# Patient Record
Sex: MALE | Race: WHITE | NOT HISPANIC OR LATINO | Employment: FULL TIME | URBAN - NONMETROPOLITAN AREA
[De-identification: names, ages, dates, MRNs, and addresses within clinical notes are randomized per-mention and may not be internally consistent; named-entity substitution may affect disease eponyms.]

---

## 2023-04-07 PROBLEM — R05.9 COUGH: Status: ACTIVE | Noted: 2023-04-07

## 2024-10-06 ENCOUNTER — HOSPITAL ENCOUNTER (EMERGENCY)
Facility: HOSPITAL | Age: 34
Discharge: HOME | End: 2024-10-06
Payer: COMMERCIAL

## 2024-10-06 ENCOUNTER — APPOINTMENT (OUTPATIENT)
Dept: RADIOLOGY | Facility: HOSPITAL | Age: 34
End: 2024-10-06
Payer: COMMERCIAL

## 2024-10-06 VITALS
HEIGHT: 73 IN | SYSTOLIC BLOOD PRESSURE: 113 MMHG | DIASTOLIC BLOOD PRESSURE: 78 MMHG | HEART RATE: 60 BPM | TEMPERATURE: 97.9 F | OXYGEN SATURATION: 97 % | RESPIRATION RATE: 16 BRPM | WEIGHT: 135 LBS | BODY MASS INDEX: 17.89 KG/M2

## 2024-10-06 DIAGNOSIS — S63.501A WRIST SPRAIN, RIGHT, INITIAL ENCOUNTER: Primary | ICD-10-CM
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
## 2024-10-06 PROCEDURE — 73130 X-RAY EXAM OF HAND: CPT | Mod: RT

## 2024-10-06 PROCEDURE — 99284 EMERGENCY DEPT VISIT MOD MDM: CPT | Mod: 25

## 2024-10-06 PROCEDURE — 73110 X-RAY EXAM OF WRIST: CPT | Mod: RT

## 2024-10-06 PROCEDURE — 73110 X-RAY EXAM OF WRIST: CPT | Mod: RIGHT SIDE | Performed by: RADIOLOGY

## 2024-10-06 PROCEDURE — 73130 X-RAY EXAM OF HAND: CPT | Mod: RIGHT SIDE | Performed by: RADIOLOGY

## 2024-10-06 ASSESSMENT — COLUMBIA-SUICIDE SEVERITY RATING SCALE - C-SSRS
1. IN THE PAST MONTH, HAVE YOU WISHED YOU WERE DEAD OR WISHED YOU COULD GO TO SLEEP AND NOT WAKE UP?: NO
6. HAVE YOU EVER DONE ANYTHING, STARTED TO DO ANYTHING, OR PREPARED TO DO ANYTHING TO END YOUR LIFE?: NO
2. HAVE YOU ACTUALLY HAD ANY THOUGHTS OF KILLING YOURSELF?: NO

## 2024-10-06 ASSESSMENT — PAIN DESCRIPTION - ORIENTATION: ORIENTATION: RIGHT

## 2024-10-06 ASSESSMENT — PAIN SCALES - GENERAL: PAINLEVEL_OUTOF10: 5 - MODERATE PAIN

## 2024-10-06 ASSESSMENT — PAIN DESCRIPTION - PAIN TYPE: TYPE: ACUTE PAIN

## 2024-10-06 ASSESSMENT — PAIN - FUNCTIONAL ASSESSMENT: PAIN_FUNCTIONAL_ASSESSMENT: 0-10

## 2024-10-06 ASSESSMENT — PAIN DESCRIPTION - LOCATION: LOCATION: WRIST

## 2024-10-06 NOTE — Clinical Note
Candelario Jacobo was seen and treated in our emergency department on 10/6/2024.  He may return to work on 10/09/2024.  Light duty please     If you have any questions or concerns, please don't hesitate to call.      An Teran, APRN-CNP

## 2024-10-06 NOTE — ED PROVIDER NOTES
Chief Complaint   Patient presents with    Fall     Patient ambulatory to ED with c/o right wrist injury s/p skateboarding accident yesterday. Reports fall onto outstretched arm. C/o increased right wrist pain and swelling today. + head injury without LOC. Denies dizziness, blurred vision or nausea.    Wrist Injury        Patient History    History reviewed. No pertinent past medical history.   History reviewed. No pertinent surgical history.   No family history on file.   Social History     Social History Narrative    Not on file      No Known Allergies     PMH: Reviewed  PSH: Reviewed  Social History: Reviewed.   Allergies reviewed.     HPI: Candelario Jacobo is a right handed 34 y.o. male who presents to the ED today unaccompanied with complaints of right wrist pain.  Skateboarding injury yesterday.  Fell on his outstretched arm.  Having increased pain in the wrist and into the thumb today.  Also notes head injury, no LOC, denies have any dizziness, blurred vision, nausea today.  Took Tylenol at home.    PHYSICAL EXAM:    GENERAL: Vitals noted, no distress. Alert and oriented x 3. Non-toxic.       HEAD: Normocephalic, atraumatic.     NECK: Supple. No midline or paraspinal tenderness through full range of motion.      CARDIAC: Regular rate, rhythm. No murmurs or rubs.    RESPIRATORY: Lungs clear and equal bilaterally. No respiratory distress.     MUSCULOSKELETAL & SKIN:  Warm, dry, and intact. No rash/lesions. No peripheral edema.  Pain on palpation of the right wrist, radial aspect, into the thumb.  Radial pulse 2+ and bounding.  Sensation intact throughout the hand.  Decreased range of motion of the wrist due to pain, full range of motion of the fingers.    NEURO: No focal neurologic deficits, acting appropriately.     Labs Reviewed - No data to display     XR wrist right 3+ views   Final Result   No fracture of the right hand or wrist.   Signed by Otis Marcus MD      XR hand right 3+ views   Final Result   No  fracture of the right hand or wrist.   Signed by Otis Marcus MD           Medical Decision Making         ED COURSE: This patient was seen and examined by myself independently.  Patient declined pain medication here.  Sent for x-ray imaging the right wrist and the right hand.  These are noted above, showing no fracture of the right hand or wrist.  Concern for scaphoid tenderness, patient placed in a Velcro thumb spica splint.  Advised to follow-up in 1 week for repeat evaluation and repeat x-ray as needed.  He verbalized understanding.  Given a note for work to be on light duty this week.  Recommended Motrin or Tylenol for pain at home.  Return to ED for any new or worsening symptoms.  Discharged home in stable condition with computer instructions given.      DIAGNOSTIC IMPRESSION: #1 right wrist sprain status post fall #2 concern for occult scaphoid fracture     An Teran, MIRELLA-CNP  10/06/24 1803

## 2024-10-06 NOTE — Clinical Note
Candelario Jacobo was seen and treated in our emergency department on 10/6/2024.  He may return to work on 10/08/2024.  Light duty please     If you have any questions or concerns, please don't hesitate to call.      An Teran, APRN-CNP

## 2024-10-11 ENCOUNTER — HOSPITAL ENCOUNTER (OUTPATIENT)
Dept: CARDIOLOGY | Facility: HOSPITAL | Age: 34
Discharge: HOME | End: 2024-10-11
Payer: COMMERCIAL

## 2024-10-11 ENCOUNTER — OFFICE VISIT (OUTPATIENT)
Dept: PRIMARY CARE | Facility: CLINIC | Age: 34
End: 2024-10-11
Payer: COMMERCIAL

## 2024-10-11 VITALS
DIASTOLIC BLOOD PRESSURE: 84 MMHG | HEIGHT: 72 IN | WEIGHT: 138.8 LBS | BODY MASS INDEX: 18.8 KG/M2 | SYSTOLIC BLOOD PRESSURE: 129 MMHG | HEART RATE: 60 BPM

## 2024-10-11 DIAGNOSIS — Z00.00 HEALTH MAINTENANCE EXAMINATION: ICD-10-CM

## 2024-10-11 DIAGNOSIS — S62.011A CLOSED DISPLACED FRACTURE OF DISTAL POLE OF SCAPHOID BONE OF RIGHT WRIST, INITIAL ENCOUNTER: ICD-10-CM

## 2024-10-11 DIAGNOSIS — R55 SYNCOPE, UNSPECIFIED SYNCOPE TYPE: ICD-10-CM

## 2024-10-11 DIAGNOSIS — D23.30 DERMOID CYST OF FACE: Primary | ICD-10-CM

## 2024-10-11 LAB — BODY SURFACE AREA: 1.79 M2

## 2024-10-11 PROCEDURE — 99204 OFFICE O/P NEW MOD 45 MIN: CPT

## 2024-10-11 PROCEDURE — 99385 PREV VISIT NEW AGE 18-39: CPT

## 2024-10-11 PROCEDURE — 99406 BEHAV CHNG SMOKING 3-10 MIN: CPT

## 2024-10-11 PROCEDURE — 3008F BODY MASS INDEX DOCD: CPT

## 2024-10-11 PROCEDURE — 9420000001 HC RT PATIENT EDUCATION 5 MIN

## 2024-10-11 PROCEDURE — 4004F PT TOBACCO SCREEN RCVD TLK: CPT

## 2024-10-11 PROCEDURE — 93242 EXT ECG>48HR<7D RECORDING: CPT

## 2024-10-11 ASSESSMENT — ENCOUNTER SYMPTOMS
ABDOMINAL PAIN: 0
FEVER: 0
SHORTNESS OF BREATH: 0
JOINT SWELLING: 1
PALPITATIONS: 0
DYSURIA: 0
NERVOUS/ANXIOUS: 0
CHILLS: 0
SLEEP DISTURBANCE: 0
HEADACHES: 0
LIGHT-HEADEDNESS: 0
FATIGUE: 0

## 2024-10-11 NOTE — LETTER
October 11, 2024     Patient: Candelario Jacobo   YOB: 1990   Date of Visit: 10/11/2024       To Whom It May Concern:    It is my medical opinion that Candelario Jacobo should remain off work 10/7/2024-10/21/2024, this is subject to change and he will be re-evaluated prior to returning to work to determine if his injury is healed and he can perform job duties adequately and safely.    If you have any questions or concerns, please don't hesitate to call.         Sincerely,        Alyson Morillo, APRN-CNP    CC: No Recipients

## 2024-10-11 NOTE — PROGRESS NOTES
"Subjective   Patient ID: Candelario Jacobo is a 34 y.o. male who presents for Establish Care and ER Follow-up.    HPI   Here today to establish as a new patient. PMH reviewed.    He was also in the ER 10/6 after a skateboarding accident where he landed on his right wrist. Reports n/t in the right thumb. He can move all of his fingers, has a very weak  strength.  Painful palpation on radial and side.  2+ radial pulse.   He works as a  and will need to remain off work until this injury is healed. He is taking tylenol BID with some relief.   Reports he has had 3 events this year, where he gets super dizzy, gets weak has to sit down and takes a couple minutes to recover. Denies chest pain during the event, does report he feels diaphoretic when this happen.     I strongly advised Candelario Jacobo to consider smoking cessation.  I offered support and advice on techniques to help with cessation. >3 minuites discussing smoking cessation.    Review of Systems   Constitutional:  Negative for chills, fatigue and fever.   Respiratory:  Negative for shortness of breath.    Cardiovascular:  Negative for chest pain, palpitations and leg swelling.   Gastrointestinal:  Negative for abdominal pain.   Genitourinary:  Negative for dysuria.   Musculoskeletal:  Positive for joint swelling.   Neurological:  Negative for light-headedness and headaches.   Psychiatric/Behavioral:  Negative for sleep disturbance. The patient is not nervous/anxious.        Objective   /84 (Patient Position: Sitting)   Pulse 60   Ht 1.825 m (5' 11.85\")   Wt 63 kg (138 lb 12.8 oz)   BMI 18.90 kg/m²     Physical Exam  Cardiovascular:      Rate and Rhythm: Normal rate and regular rhythm.      Heart sounds: Normal heart sounds.   Musculoskeletal:      Right wrist: Swelling and tenderness present. Decreased range of motion.      Right lower leg: No edema.      Left lower leg: No edema.   Skin:     Capillary Refill: Capillary refill takes less " than 2 seconds.   Neurological:      Mental Status: He is alert and oriented to person, place, and time.         Assessment/Plan   Problem List Items Addressed This Visit    None  Visit Diagnoses         Codes    Dermoid cyst of face    -  Primary D23.30    Relevant Orders    Referral to Dermatology    Closed displaced fracture of distal pole of scaphoid bone of right wrist, initial encounter     S62.011A    Health maintenance examination     Z00.00    Relevant Orders    CBC and Auto Differential    Comprehensive Metabolic Panel    Lipid Panel    TSH with reflex to Free T4 if abnormal    Holter or Event Cardiac Monitor    Syncope, unspecified syncope type     R55             Possible scaphoid fracutre  -Will order repeat xrays   -Continue with right wrist brace   -Tylenol BID PRN     Yearly physical  -Lab work ordered

## 2024-10-17 ENCOUNTER — TELEPHONE (OUTPATIENT)
Dept: PRIMARY CARE | Facility: CLINIC | Age: 34
End: 2024-10-17
Payer: COMMERCIAL

## 2024-10-17 NOTE — LETTER
October 18, 2024     Patient: Candelario Jacobo   YOB: 1990   Date of Visit: 10/17/2024       To Whom It May Concern:    It is my medical opinion that Candelario Jacobo  is to remain off work until 10/28/24 due to his injury. This date is subject to change depending on his healing process .    If you have any questions or concerns, please don't hesitate to call.         Sincerely,        MIRELLA Mcleod-CNP    CC: No Recipients

## 2024-10-17 NOTE — TELEPHONE ENCOUNTER
Pt came in asking for a letter for another week off work due to his hand injury. He was told that if you were ok w this then he can view the letter in [employer].

## 2024-10-25 ENCOUNTER — TELEPHONE (OUTPATIENT)
Dept: PRIMARY CARE | Facility: CLINIC | Age: 34
End: 2024-10-25
Payer: COMMERCIAL

## 2024-10-25 LAB — BODY SURFACE AREA: 1.79 M2

## 2024-10-25 NOTE — LETTER
October 25, 2024     Patient: Candelario Jacobo   YOB: 1990   Date of Visit: 10/25/2024       To Whom It May Concern:    It is my medical opinion that Candelario Jacobo  is to remain off work due to his injury until 11/4/2024, this date is subject to change pending the healing process .    If you have any questions or concerns, please don't hesitate to call.         Sincerely,        MIRELLA Mcleod-CNP    CC: No Recipients

## 2024-11-01 LAB — BODY SURFACE AREA: 1.79 M2

## 2024-11-05 ENCOUNTER — TELEPHONE (OUTPATIENT)
Dept: PRIMARY CARE | Facility: CLINIC | Age: 34
End: 2024-11-05
Payer: COMMERCIAL

## 2024-11-05 NOTE — LETTER
November 5, 2024     Patient: Candelario Jacobo   YOB: 1990   Date of Visit: 11/5/2024       To Whom It May Concern:    It is my medical opinion that Candelario Jacobo  is to remain off work until 11/11/2024 due to a wrist injury .    If you have any questions or concerns, please don't hesitate to call.         Sincerely,        Maya Main MA    CC: No Recipients

## 2024-11-11 ENCOUNTER — TELEPHONE (OUTPATIENT)
Dept: PRIMARY CARE | Facility: CLINIC | Age: 34
End: 2024-11-11
Payer: COMMERCIAL

## 2024-11-11 NOTE — LETTER
November 12, 2024     Patient: Candelario Jacobo   YOB: 1990   Date of Visit: 11/11/2024       To Whom It May Concern:    It is my medical opinion that Candelario Jacobo  is to remain off work until 11/18/2024 due to injury .    If you have any questions or concerns, please don't hesitate to call.         Sincerely,        Alyson Morillo, APRN-CNP    CC: No Recipients

## 2024-11-11 NOTE — TELEPHONE ENCOUNTER
Is requesting another note for this whole week off work, said he has the splint off and is starting to use it more but this would be the last week he can have off.   Please advise